# Patient Record
Sex: FEMALE | Race: BLACK OR AFRICAN AMERICAN
[De-identification: names, ages, dates, MRNs, and addresses within clinical notes are randomized per-mention and may not be internally consistent; named-entity substitution may affect disease eponyms.]

---

## 2018-01-14 ENCOUNTER — HOSPITAL ENCOUNTER (EMERGENCY)
Dept: HOSPITAL 17 - NEPC | Age: 18
Discharge: HOME | End: 2018-01-14
Payer: SELF-PAY

## 2018-01-14 VITALS
TEMPERATURE: 98.6 F | SYSTOLIC BLOOD PRESSURE: 125 MMHG | OXYGEN SATURATION: 99 % | HEART RATE: 76 BPM | DIASTOLIC BLOOD PRESSURE: 74 MMHG | RESPIRATION RATE: 16 BRPM

## 2018-01-14 VITALS — HEIGHT: 64 IN | BODY MASS INDEX: 33.87 KG/M2 | WEIGHT: 198.42 LBS

## 2018-01-14 DIAGNOSIS — L50.1: Primary | ICD-10-CM

## 2018-01-14 PROCEDURE — 99284 EMERGENCY DEPT VISIT MOD MDM: CPT

## 2018-01-14 NOTE — PD
HPI


Chief Complaint:  Allergic/Adverse Reaction


Time Seen by Provider:  01:07


Travel History


International Travel<30 days:  No


Contact w/Intl Traveler<30days:  No


Traveled to known affect area:  No





History of Present Illness


HPI


17 year-old female presents to the emergency department for complaint of 

urticarial reaction.  According the patient since she moved into her dorm over 

the past several weeks each night she has an acute allergic reaction with 

urticaria.  Patient states she typically notices urticaria about her neck and 

face.  Patient states each morning the symptoms have resolved.  Patient has 

taken no medications.  Patient is unable to identify known or precipitating 

allergens.  No prior history of allergic reaction.  Patient denies any lip 

tongue throat swelling or stridor hoarseness shortness of breath or wheezing.  

Patient also had no chest pain no near-syncope or syncope no Abdominal pain 

nausea vomiting or diarrhea.  Patient states that tonight she did notice some 

slight wheezing and has prior history of asthma as a child so decided to come 

to the emergency room for evaluation.  Patient states the wheezing has 

resolved.  Patient states she is not aware of any new detergents linens 

clothing foods or obvious precipitating allergens.  Patient is taking no 

medications prior to arrival to the emergency department did not even take 

Benadryl.





PFSH


Past Medical History


*** Narrative Medical


Negative past medical history negative surgical history no tobacco use nursing 

notes reviewed


Medical History:  Denies Significant Hx


Pregnant?:  Not Pregnant


LMP:  12/16/17





Past Surgical History


Surgical History:  No Previous Surgery





Social History


Alcohol Use:  No


Tobacco Use:  No


Substance Use:  No





Allergies-Medications


(Allergen,Severity, Reaction):  


Coded Allergies:  


     No Known Allergies (Unverified , 1/14/18)


Reported Meds & Prescriptions





Reported Meds & Active Scripts


Active


Epipen 2-Tomas Inj (Epinephrine) 0.3 Mg/0.3 Ml Pfpen 0.3 Mg IM ONCE PRN


Medrol Dosepak (Methylprednisolone) 4 Mg Dspk 4 Mg PO AS DIRECTED


     Per Pharmacist direction








Review of Systems


Except as stated in HPI:  all other systems reviewed are Neg





Physical Exam


Narrative


GENERAL: Well-developed well-nourished female in no acute distress no 

respiratory distress; no stridor or hoarseness.


SKIN: Warm and dry.  Urticaria to the left forehead; no diffuse urticarial 

reaction no petechia no purpura no vesicles no pustules


HEAD: Normocephalic.


EYES: No scleral icterus. No injection or drainage.  ENT: Mucous membranes 

moist airway is patent no lip tongue or throat swelling


NECK: Supple, trachea midline. No JVD or lymphadenopathy.


CARDIOVASCULAR: Regular rate and rhythm without murmurs, gallops, or rubs. 


RESPIRATORY: Breath sounds equal bilaterally. No accessory muscle use.


GASTROINTESTINAL: Abdomen soft, non-tender, nondistended. 


MUSCULOSKELETAL: No cyanosis, or edema. 


BACK: Nontender without obvious deformity. No CVA tenderness.








Data


Data


Last Documented VS





Vital Signs








  Date Time  Temp Pulse Resp B/P (MAP) Pulse Ox O2 Delivery O2 Flow Rate FiO2


 


1/14/18 00:26 98.6 76 16 125/74 (91) 99 Room Air  








Orders





 Orders


Prednisone (Deltasone) (1/14/18 01:15)


Famotidine (Pepcid) (1/14/18 01:15)


Diphenhydramine (Benadryl) (1/14/18 01:15)








[facility]


Medical Decision Making


Medical Screen Exam Complete:  Yes


Emergency Medical Condition:  Yes


Medical Record Reviewed:  Yes


Differential Diagnosis


Acute allergic reaction, idiopathic urticaria; no evidence for anaphylaxis or 

angioedema


Narrative Course


Patient has been having symptoms for several weeks and symptoms according to 

patient have improved this evening therefore administered prednisone Benadryl 

and Pepcid





Patient reports she does not want any medications in the emergency department 

hives are resolving spontaneously will take prescriptions but does not want any 

medication administered while visiting in the ED.  Patient is otherwise stable 

for outpatient management again encouraged to follow-up with allergist or 

primary care provider





Diagnosis





 Primary Impression:  


 Idiopathic urticaria


Referrals:  


[facility]


call for appointment





Primary Care Physician


call for appointment


Patient Instructions:  General Instructions





***Additional Instructions:  


Complete course of Medrol Dosepak


Follow-up with [facility] or primary care provider


Take Benadryl 25-50 mg as often as every 6-46 hours as needed for hives or may 

take Zyrtec 10 mg once daily


Takes Zantac 150 twice daily for the next 7 days


Return to the emergency department for any concerns or change in condition


***Med/Other Pt SpecificInfo:  Prescription(s) given


Scripts


Epinephrine Inj (Epipen 2-Tomas Inj) 0.3 Mg/0.3 Ml Pfpen


0.3 MG IM ONCE Y for ALLERGIC REACTION, #1 PACK 0 Refills


   Prov: Lori Avalos MD         1/14/18 


Methylprednisolone Dosepak (Medrol Dosepak) 4 Mg Dspk


4 MG PO AS DIRECTED, #1 DSPK 0 Refills


   Per Pharmacist direction


   Prov: Lori Avalos MD         1/14/18


Disposition:  01 DISCHARGE HOME


Condition:  Stable











Lori Avalos MD Jan 14, 2018 01:19